# Patient Record
Sex: FEMALE | Race: WHITE | NOT HISPANIC OR LATINO | URBAN - METROPOLITAN AREA
[De-identification: names, ages, dates, MRNs, and addresses within clinical notes are randomized per-mention and may not be internally consistent; named-entity substitution may affect disease eponyms.]

---

## 2021-12-08 ENCOUNTER — OFFICE VISIT (OUTPATIENT)
Dept: FAMILY MEDICINE | Facility: CLINIC | Age: 61
End: 2021-12-08

## 2021-12-08 VITALS
DIASTOLIC BLOOD PRESSURE: 78 MMHG | HEART RATE: 89 BPM | WEIGHT: 172 LBS | BODY MASS INDEX: 27 KG/M2 | SYSTOLIC BLOOD PRESSURE: 122 MMHG | OXYGEN SATURATION: 96 % | HEIGHT: 67 IN | TEMPERATURE: 97.9 F

## 2021-12-08 DIAGNOSIS — M79.671 RIGHT FOOT PAIN: ICD-10-CM

## 2021-12-08 DIAGNOSIS — E06.3 HASHIMOTO'S THYROIDITIS: ICD-10-CM

## 2021-12-08 DIAGNOSIS — R18.8 OTHER ASCITES: ICD-10-CM
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
## 2021-12-08 DIAGNOSIS — S22.41XD CLOSED FRACTURE OF MULTIPLE RIBS OF RIGHT SIDE WITH ROUTINE HEALING, SUBSEQUENT ENCOUNTER: ICD-10-CM

## 2021-12-08 PROBLEM — Z87.410 HISTORY OF CERVICAL DYSPLASIA: Status: ACTIVE | Noted: 2021-12-08

## 2021-12-08 PROBLEM — Z71.89 ACP (ADVANCE CARE PLANNING): Status: ACTIVE | Noted: 2021-12-08

## 2021-12-08 PROBLEM — Z76.89 HEALTH CARE HOME: Status: ACTIVE | Noted: 2021-12-08
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
## 2021-12-08 PROBLEM — Q65.89 ACETABULAR DYSPLASIA: Status: ACTIVE | Noted: 2020-02-12

## 2021-12-08 RX ORDER — OXYCODONE HYDROCHLORIDE 5 MG/1
TABLET ORAL
COMMUNITY
Start: 2021-12-04 | End: 2021-12-08

## 2021-12-08 RX ORDER — OXYCODONE HYDROCHLORIDE 5 MG/1
5 TABLET ORAL EVERY 6 HOURS PRN
Qty: 5 TABLET | Refills: 0 | Status: SHIPPED | OUTPATIENT
Start: 2021-12-08

## 2021-12-08 RX ORDER — METHOCARBAMOL 500 MG/1
TABLET, FILM COATED ORAL
COMMUNITY
Start: 2021-12-04

## 2021-12-08 ASSESSMENT — MIFFLIN-ST. JEOR: SCORE: 1391.78
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
## 2021-12-08 NOTE — LETTER
[facility]  [address], [address]  [place]  [address]    December 8, 2021        Susan Barraza  [address] [place] [address]              Re:  Susan Davis is on medications for pain and should not drive until next week Wednesday. She should be reevaluated at that time. She alsohas pain and cannot walk extensively, or lift or turn until this time.         If you have any further questions or problems, please contact our office at [phone].          Yaritza Zhu MD
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
## 2024-06-17 PROBLEM — Z76.89 HEALTH CARE HOME: Status: RESOLVED | Noted: 2021-12-08 | Resolved: 2024-06-17
